# Patient Record
Sex: FEMALE | Race: WHITE
[De-identification: names, ages, dates, MRNs, and addresses within clinical notes are randomized per-mention and may not be internally consistent; named-entity substitution may affect disease eponyms.]

---

## 2019-03-26 ENCOUNTER — HOSPITAL ENCOUNTER (INPATIENT)
Dept: HOSPITAL 92 - L&D | Age: 29
LOS: 1 days | Discharge: HOME | End: 2019-03-27
Attending: OBSTETRICS & GYNECOLOGY | Admitting: OBSTETRICS & GYNECOLOGY
Payer: COMMERCIAL

## 2019-03-26 VITALS — BODY MASS INDEX: 44.4 KG/M2

## 2019-03-26 DIAGNOSIS — Z3A.39: ICD-10-CM

## 2019-03-26 DIAGNOSIS — Z88.2: ICD-10-CM

## 2019-03-26 DIAGNOSIS — Z88.1: ICD-10-CM

## 2019-03-26 LAB
HBSAG INDEX: 0.33 S/CO (ref 0–0.99)
HGB BLD-MCNC: 10.4 G/DL (ref 12–16)
MCH RBC QN AUTO: 29.3 PG (ref 27–31)
MCV RBC AUTO: 86.8 FL (ref 78–98)
PLATELET # BLD AUTO: 205 THOU/UL (ref 130–400)
RBC # BLD AUTO: 3.55 MILL/UL (ref 4.2–5.4)
SYPHILIS ANTIBODY INDEX: 0.04 S/CO
WBC # BLD AUTO: 10.1 THOU/UL (ref 4.8–10.8)

## 2019-03-26 PROCEDURE — 36415 COLL VENOUS BLD VENIPUNCTURE: CPT

## 2019-03-26 PROCEDURE — 86900 BLOOD TYPING SEROLOGIC ABO: CPT

## 2019-03-26 PROCEDURE — 0UQMXZZ REPAIR VULVA, EXTERNAL APPROACH: ICD-10-PCS | Performed by: OBSTETRICS & GYNECOLOGY

## 2019-03-26 PROCEDURE — 86850 RBC ANTIBODY SCREEN: CPT

## 2019-03-26 PROCEDURE — 86780 TREPONEMA PALLIDUM: CPT

## 2019-03-26 PROCEDURE — 86901 BLOOD TYPING SEROLOGIC RH(D): CPT

## 2019-03-26 PROCEDURE — 10907ZC DRAINAGE OF AMNIOTIC FLUID, THERAPEUTIC FROM PRODUCTS OF CONCEPTION, VIA NATURAL OR ARTIFICIAL OPENING: ICD-10-PCS | Performed by: OBSTETRICS & GYNECOLOGY

## 2019-03-26 PROCEDURE — 87340 HEPATITIS B SURFACE AG IA: CPT

## 2019-03-26 PROCEDURE — 85027 COMPLETE CBC AUTOMATED: CPT

## 2019-03-26 PROCEDURE — 51702 INSERT TEMP BLADDER CATH: CPT

## 2019-03-26 RX ADMIN — DOCUSATE CALCIUM SCH MG: 240 CAPSULE, LIQUID FILLED ORAL at 21:36

## 2019-03-26 NOTE — PDOC.OPDEL
OB Operative/Delivery Note


Delivery Dr/Surgeon: Sohail


Pre-Delivery Diagnosis: elective induction


Procedure/Post Delivery Dx: spontaneous vaginal delivery


Weeks gestation: 39





- Findings


  ** A


Sex: female


Apgar - 1 min: 8


Apgar - 5 min: 9





- Additional Findings/Plan


Placenta delivered: spontaneous


Repaired Obstetrical Laceration: periurethral (repaired w villegas in place)


Estimated blood loss: 400ml, QBL pending


Compilations/Other Findings: 





loose nuchal reduced at perineum


Post delivery plan: routine recovery

## 2019-03-26 NOTE — PDOC.LDHP
Labor and Delivery H&P


Chief complaint: scheduled induction (elective IOL @ 39 weeks)


HPI: 





Pt is a  @ 39.0 weeks here for elective IOL.


Current gestational age (weeks): 39


Due date: 19


Dating criteria: last menstrual period, first trimester ultrasound


Grav: 2


Para: 1


Current pregnancy complications: none


Abnormal US findings: No


Past Medical History: 





obesity


Current medications: pre-shayna vitamins


Previous surgical history: none


Allergies/Adverse Reactions: 


 Allergies











Allergy/AdvReac Type Severity Reaction Status Date / Time


 


amoxicillin Allergy  Hives Verified 19 06:16


 


sulfamethoxazole Allergy  Hives Verified 19 06:16





[From Bactrim]     


 


trimethoprim [From Bactrim] Allergy  Hives Verified 19 06:16











Social history: none





- Physical Exam


Vital signs reviewed and normal: yes


General: NAD


Heart: RRR


Lungs: CTAB


Abdomen: gravid


Extremeties: no edema


FHT: category 1





- Vaginal Exam


cm dilated: 4 (clear fluid on AROM)


Effacement: 50%


Station: -2





- OB Labs


Blood type: A


RH: positive


Antibody Screen: negative


HIV: negative


RPR: negative


HEPSAg: negative


1 hour GCT: positive


3 hour GTT: WNL


GBS: negative


Rubella: immune





- Assessment


L&D Assessment: elective induction at term





- Plan


Plan: admit to L&D, labor augmentation if indicated, informed consent obtained, 

anesthesia consult for pain management


-: 





A/P: 27yo  @ 39 weeks here for scheduled elective IOL.  AROM on admit 

exam with FSE placed to better monitor baby.  Pitocin ordered, FHT reassuring.

## 2019-03-27 VITALS — SYSTOLIC BLOOD PRESSURE: 153 MMHG | TEMPERATURE: 98.1 F | DIASTOLIC BLOOD PRESSURE: 85 MMHG

## 2019-03-27 RX ADMIN — DOCUSATE CALCIUM SCH MG: 240 CAPSULE, LIQUID FILLED ORAL at 09:52

## 2019-03-27 NOTE — PDOC.PP
Post Partum Progress Note


Post Partum Day #: 1


Subjective: 





doing well, no concerns, interested in DC this afternoon if baby DC, nursing 

well


PO intake tolerated: yes


Flatus: yes


Ambulation: yes


 Vital Signs (12 hours)











  Temp Pulse Resp BP Pulse Ox


 


 03/27/19 12:03  98.1 F  67  20  153/85 H 


 


 03/27/19 08:27  97.6 F  73  20  120/75  98


 


 03/27/19 05:45  97.8 F  76  20  117/67  98


 


 03/27/19 01:50  98.0 F  69  18  133/75  98








 Weight











Weight                         275 lb

















- Physical Examination


General: NAD


Respiratory: non-labored breathing


Abdominal: no distention


Fundus firm & at: below umb


Skin: no rash


Psychiatric: A&Ox3, normal affect


Result Diagrams: 


 03/26/19 07:12





Additional Labs: 


 Post Partum Labs











Blood Type  A POSITIVE   03/26/19  07:12    


 


Hep Bs Antigen  Non-Reactive S/CO (NonReactive)   03/26/19  07:12    











(1) 39 weeks gestation of pregnancy


Code(s): Z3A.39 - 39 WEEKS GESTATION OF PREGNANCY   Status: Acute   





(2) Spontaneous vaginal delivery


Code(s): O80 - ENCOUNTER FOR FULL-TERM UNCOMPLICATED DELIVERY   Status: Acute   





- Assessment/Plan





A/P: PPD 1 doing well, will DC home today if baby DC.